# Patient Record
Sex: MALE | Race: OTHER | NOT HISPANIC OR LATINO
[De-identification: names, ages, dates, MRNs, and addresses within clinical notes are randomized per-mention and may not be internally consistent; named-entity substitution may affect disease eponyms.]

---

## 2022-03-16 ENCOUNTER — TRANSCRIPTION ENCOUNTER (OUTPATIENT)
Age: 67
End: 2022-03-16

## 2022-03-18 ENCOUNTER — OUTPATIENT (OUTPATIENT)
Dept: OUTPATIENT SERVICES | Facility: HOSPITAL | Age: 67
LOS: 1 days | End: 2022-03-18

## 2022-03-18 ENCOUNTER — APPOINTMENT (OUTPATIENT)
Dept: DISASTER EMERGENCY | Facility: HOSPITAL | Age: 67
End: 2022-03-18

## 2022-03-18 VITALS
TEMPERATURE: 98 F | RESPIRATION RATE: 16 BRPM | OXYGEN SATURATION: 98 % | SYSTOLIC BLOOD PRESSURE: 119 MMHG | HEART RATE: 63 BPM | DIASTOLIC BLOOD PRESSURE: 79 MMHG

## 2022-03-18 VITALS
OXYGEN SATURATION: 98 % | TEMPERATURE: 98 F | SYSTOLIC BLOOD PRESSURE: 124 MMHG | WEIGHT: 154.1 LBS | DIASTOLIC BLOOD PRESSURE: 74 MMHG | HEART RATE: 61 BPM | HEIGHT: 65 IN | RESPIRATION RATE: 16 BRPM

## 2022-03-18 DIAGNOSIS — U07.1 COVID-19: ICD-10-CM

## 2022-03-18 RX ORDER — SOTROVIMAB 62.5 MG/ML
500 INJECTION, SOLUTION, CONCENTRATE INTRAVENOUS ONCE
Refills: 0 | Status: COMPLETED | OUTPATIENT
Start: 2022-03-18 | End: 2022-03-18

## 2022-03-18 RX ORDER — SODIUM CHLORIDE 9 MG/ML
250 INJECTION INTRAMUSCULAR; INTRAVENOUS; SUBCUTANEOUS
Refills: 0 | Status: DISCONTINUED | OUTPATIENT
Start: 2022-03-18 | End: 2022-04-01

## 2022-03-18 RX ADMIN — SODIUM CHLORIDE 100 MILLILITER(S): 9 INJECTION INTRAMUSCULAR; INTRAVENOUS; SUBCUTANEOUS at 10:27

## 2022-03-18 RX ADMIN — SOTROVIMAB 116 MILLIGRAM(S): 62.5 INJECTION, SOLUTION, CONCENTRATE INTRAVENOUS at 10:20

## 2022-03-18 NOTE — MONOCLONAL ANTIBODY INFUSION - ASSESSMENT AND PLAN
CC: Monoclonal Antibody Infusion/COVID 19 Positive  66y Male with hx of CKD, DVT on Xarelto, and recent dx of COVID 19+ who presents today for elective Sotrovimab infusion. Patient has been screened and was deemed to be a candidate.    Symptoms/ Criteria  Date of Symptom Onset: 3/15/22  Symptoms: cough, congestion, chills, headaches  Date of Positive COVID PCR: 3/16/22  Risk Profile includes: CKD, unvaccinated    PMHx:  Infection due to severe acute respiratory syndrome coronavirus 2 (SARS-CoV-2)    ASSESSMENT:  Pt is COVID positive and symptomatic who was referred to the infusion center for elective Monoclonal antibody infusion (Sotrovimab).    PLAN:  - Infusion procedure explained to patient.  1. FDA has authorized emergency use Sotrovimab, which is not an FDA-approved biological product.  2. The patient or patient's caregiver has the option to accept or refuse administration of Sotrovimab.   3. The significant known and potential risks and benefits of Sotrovimab and the extent to which such risks and benefits are unknown.  4. Information on available alternative treatments and risks and benefits of those alternatives.  5.  Patient verbalized understanding of plan and agrees to treatment.  6.  All questions answered.  - Consent for monoclonal antibody infusion obtained.   - Infuse Sotrovimab 500mg IV over 30 minutes.  - Observe patient for one hour post infusion, and then if stable discharge home with oupt follow up as planned by PMD.    POST INFUSION ASSESSMENT:   Patient completed infusion, and monitored x 1 hour post-infusion with no adverse reactions noted, remained hemodynamically stable.    - Patient tolerated infusion well; denies complaints of chest pain/SOB/dizziness/palpitations.   - VSS for discharge home.  - D/C instructions given/ fact sheet included.  - Patient advised to follow-up with PCP.   - Patient was instructed to self-isolate and use infection control measures (e.g wear mask, isolate, social distance, avoid sharing personal items, clean and disinfect "high touch" surfaces, and frequent handwashing according to the CDC guidelines.   - The patient was informed on what symptoms to be aware of for the next couple of days, and if there are any issues to call the 24/7 clinical call center. Patient was instructed to follow up with primary care provider as needed.    DISCHARGE at 11:30am.    CC: Monoclonal Antibody Infusion/COVID 19 Positive  66y Male with hx of CKD, DVT on Xarelto, and recent dx of COVID 19+ who presents today for elective Sotrovimab infusion. Patient has been screened and was deemed to be a candidate.    Symptoms/ Criteria  Date of Symptom Onset: 3/15/22  Symptoms: cough, congestion, chills, headaches  Date of Positive COVID PCR: 3/16/22  Risk Profile includes: CKD, unvaccinated    PMHx:  Infection due to severe acute respiratory syndrome coronavirus 2 (SARS-CoV-2)    ASSESSMENT:  Pt is COVID positive and symptomatic who was referred to the infusion center for elective Monoclonal antibody infusion (Sotrovimab).    PLAN:  - Infusion procedure explained to patient.  1. FDA has authorized emergency use Sotrovimab, which is not an FDA-approved biological product.  2. The patient or patient's caregiver has the option to accept or refuse administration of Sotrovimab.   3. The significant known and potential risks and benefits of Sotrovimab and the extent to which such risks and benefits are unknown.  4. Information on available alternative treatments and risks and benefits of those alternatives.  5.  Patient verbalized understanding of plan and agrees to treatment.  6.  All questions answered.  - Consent for monoclonal antibody infusion obtained.   - Infuse Sotrovimab 500mg IV over 30 minutes.  - Observe patient for one hour post infusion, and then if stable discharge home with oupt follow up as planned by PMD.    POST INFUSION ASSESSMENT:   Patient completed infusion, and monitored x 1 hour post-infusion with no adverse reactions noted, remained hemodynamically stable.    - Patient tolerated infusion well; denies complaints of chest pain/SOB/dizziness/palpitations.   - VSS for discharge home.  - D/C instructions given/ fact sheet included.  - Patient advised to follow-up with PCP.   - Patient was instructed to self-isolate and use infection control measures (e.g wear mask, isolate, social distance, avoid sharing personal items, clean and disinfect "high touch" surfaces, and frequent handwashing according to the CDC guidelines.   - The patient was informed on what symptoms to be aware of for the next couple of days, and if there are any issues to call the 24/7 clinical call center. Patient was instructed to follow up with primary care provider as needed.    DISCHARGE at 11:50am.

## 2022-03-18 NOTE — MONOCLONAL ANTIBODY INFUSION - EXAM
Exam/findings:  T(C): 36.6 (03-18-22 @ 10:10), Max: 36.6 (03-18-22 @ 10:10)  HR: 60 (03-18-22 @ 10:20) (60 - 61)  BP: 116/76 (03-18-22 @ 10:20) (116/76 - 124/74)  RR: 16 (03-18-22 @ 10:20) (16 - 16)  SpO2: 96% (03-18-22 @ 10:20) (96% - 98%)    PE:   Appearance: NAD	  HEENT:  NC/AT  Cardiovascular:  No edema  Respiratory: no use of accessory muscles  Gastrointestinal:  non-distended   Skin: warm and dry  Neurologic: Non-focal  Extremities: Normal range of motion

## 2022-04-21 PROBLEM — Z00.00 ENCOUNTER FOR PREVENTIVE HEALTH EXAMINATION: Status: ACTIVE | Noted: 2022-04-21

## 2022-05-04 ENCOUNTER — APPOINTMENT (OUTPATIENT)
Dept: PHYSICAL MEDICINE AND REHAB | Facility: CLINIC | Age: 67
End: 2022-05-04
Payer: COMMERCIAL

## 2022-05-04 VITALS — HEIGHT: 65 IN | WEIGHT: 155 LBS | RESPIRATION RATE: 16 BRPM | BODY MASS INDEX: 25.83 KG/M2

## 2022-05-04 DIAGNOSIS — M54.17 RADICULOPATHY, LUMBOSACRAL REGION: ICD-10-CM

## 2022-05-04 DIAGNOSIS — Z87.891 PERSONAL HISTORY OF NICOTINE DEPENDENCE: ICD-10-CM

## 2022-05-04 PROCEDURE — 99204 OFFICE O/P NEW MOD 45 MIN: CPT

## 2022-05-04 NOTE — PHYSICAL EXAM
[FreeTextEntry1] : LUMBAR\par GEN: AAOx3. NAD.\par INSP: Spine alignment midline. No evidence of scoliosis.\par STANCE: Trendelenburg/SLS (-) R (-) L. \par GAIT: (+) Antalgic. Normal reciprocating heel to toe\par SENS: Grossly intact to LT BLE.\par REFLEXES: Symmetric patella, achilles. \par TONE: Normal. No clonus.\par SPECIAL: SLR/Slump (+) R (-) L.\par PALP: Midline/Spinous processes (-).   Paraspinals (-) R  (-) L.   \par            QL (-) R (-) L.      SIJ (-) R (-) L.            GTB (-) R (-) L.\par \par LSpine          ROM    Axial Sx    LE Sx \par Flex               Full       (+)           R (+) L (-).\par Ext                 Full       (-)           R (-) L (-).\par Lat Flex         Full       (-)           R (-) L (-).       \par Rotation         Full       (-)           R (-) L (-). \par Oblique Ext    Full       (-)           R (-) L (-).  \par \par STRENGTH:    RIGHT      LEFT\par Hip Flex            5/5 5/5\par Hip ABd            5/5 5/5\par Knee Ext           5/5 5/5\par Ankle DF           5/5 5/5\par Ankle PF           5/5 5/5\par EHL                   5/5 5/5\par EV                    5/5 5/5

## 2022-05-04 NOTE — ASSESSMENT
[FreeTextEntry1] : Impression:\par 1. RIGHT L5/S1 Radiculopathy\par \par Plan: The history and physical examination were reviewed. The diagnosis was discussed with the patient along with treatment options including physical therapy, oral medication, interventional spine procedures, and surgery; as well as alternative therapeutics such as acupuncture and massage. We also discussed the importance of weight loss, ergonomics, and posture as they pertain to the current condition as well as overall health and well being. The patient has done 3 months of PT without improvement and takes Tylenol with minimal relief. I am recommending that the patient undergo an MRI of the LSpine to further evaluate for disc pathology and nerve root involvement in preparation for possible HERBERTH. We discussed all the risks, benefits, alternative treatments, and prognosis. The patient expressed understanding and would like to move forward. The patient was educated on red flag symptoms and was instructed to call the office should the current condition worsen or new symptoms develop. The patient will follow up for imaging review. The patient expressed verbal understanding and is in agreement with the plan of care. All of the patient's questions and concerns were addressed during today's visit.

## 2022-05-04 NOTE — HISTORY OF PRESENT ILLNESS
[FreeTextEntry1] : Mr. ROSEMARY GALLOWAY is a very pleasant 66 year male who comes in for evaluation of Right Lower Back Pain that has been ongoing for 5 months without any specific injury or inciting event. Patient has done PT for the past 3 months without improvement, he takes Tylenol which provides minimal temporary relief. The pain is located primarily Right Lower Back Pain radiating to the RIGHT Leg intermittent and described as sharp, shooting. The pain is rated as 5/10 and ranges from 3-7/10. The patient's symptoms are aggravated by prolong sitting, bending and alleviated by walking . The patient is retired . The patient feels night pain, numbness/tingling but denies any weakness, or bowel/bladder dysfunction. The patient has no other complaints at this time.\par \par \par \par

## 2022-05-25 ENCOUNTER — APPOINTMENT (OUTPATIENT)
Dept: PHYSICAL MEDICINE AND REHAB | Facility: CLINIC | Age: 67
End: 2022-05-25
Payer: COMMERCIAL

## 2022-05-25 VITALS — WEIGHT: 155 LBS | HEIGHT: 65 IN | RESPIRATION RATE: 16 BRPM | BODY MASS INDEX: 25.83 KG/M2

## 2022-05-25 VITALS — HEIGHT: 65 IN | WEIGHT: 155 LBS | BODY MASS INDEX: 25.83 KG/M2

## 2022-05-25 PROCEDURE — 99214 OFFICE O/P EST MOD 30 MIN: CPT

## 2022-05-25 NOTE — DATA REVIEWED
[MRI] : MRI [FreeTextEntry1] : MRI LUMBAR SPINE 05-: Mild degenerative disc disease. No disc herniation or neurogenic compromise.

## 2022-05-25 NOTE — ASSESSMENT
[FreeTextEntry1] : Impression:\par 1. RIGHT Sciatic Neuropathy\par \par Plan: The history and physical examination were reviewed along with new imaging. There is no evidence of disc herniation/radiculopathy on MRI Lumbar Spine. Symptoms could potentially be due to sciatic nerve entrapment neuropathy. I have provided him with a referral to Neurology for further assessment and treatment, which may include EMG/NCV testing. The patient will follow up here as needed. The patient expressed verbal understanding and is in agreement with the plan of care. All of the patient's questions and concerns were addressed during today's visit.

## 2022-05-25 NOTE — HISTORY OF PRESENT ILLNESS
[FreeTextEntry1] : Mr. ROSEMARY GALLOWAY is a very pleasant 66 year male who comes in for MRI review and follow up of Right Lower Back/Leg Pain. Patient has done PT for the past 3 months without improvement. At present the symptoms are relatively unchanged. The pain is located primarily Right Lower Back Pain radiating to the RIGHT Leg intermittent and described as sharp, shooting. There is numbness/tingling in the Right foot. The pain is rated as 5/10 and ranges from 3-7/10. The patient's symptoms are aggravated by prolong sitting, bending and alleviated by walking. The patient feels night pain, numbness/tingling but denies any weakness, or bowel/bladder dysfunction. The patient has no other complaints at this time.

## 2022-05-25 NOTE — PHYSICAL EXAM
[FreeTextEntry1] : LUMBAR\par GEN: AAOx3. NAD.\par INSP: Spine alignment midline. No evidence of scoliosis.\par STANCE: Trendelenburg/SLS (-) R (-) L. \par GAIT: (+) Antalgic. Normal reciprocating heel to toe\par SENS: Grossly intact to LT BLE.\par REFLEXES: Symmetric patella, achilles. \par TONE: Normal. No clonus.\par SPECIAL: SLR/Slump (+) R (-) L.\par PALP: Midline/Spinous processes (-).   Paraspinals (-) R  (-) L.   \par            QL (-) R (-) L.      SIJ (+) R (-) L.            GTB (-) R (-) L.\par \par LSpine          ROM    Axial Sx    LE Sx \par Flex               Full       (+)           R (+) L (-).\par Ext                 Full       (-)           R (-) L (-).\par Lat Flex         Full       (-)           R (-) L (-).       \par Rotation         Full       (-)           R (-) L (-). \par Oblique Ext    Full       (-)           R (-) L (-).  \par \par STRENGTH:    RIGHT      LEFT\par Hip Flex            5/5 5/5\par Hip ABd            5/5 5/5\par Knee Ext           5/5 5/5\par Ankle DF           5/5 5/5\par Ankle PF           5/5 5/5\par EHL                   5/5 5/5\par EV                    5/5 5/5

## 2022-10-07 ENCOUNTER — APPOINTMENT (OUTPATIENT)
Dept: NEUROSURGERY | Facility: CLINIC | Age: 67
End: 2022-10-07

## 2022-10-07 ENCOUNTER — APPOINTMENT (OUTPATIENT)
Dept: SPINE | Facility: CLINIC | Age: 67
End: 2022-10-07

## 2022-10-07 VITALS
DIASTOLIC BLOOD PRESSURE: 82 MMHG | HEIGHT: 65 IN | RESPIRATION RATE: 18 BRPM | WEIGHT: 155 LBS | BODY MASS INDEX: 25.83 KG/M2 | TEMPERATURE: 97 F | HEART RATE: 59 BPM | SYSTOLIC BLOOD PRESSURE: 121 MMHG | OXYGEN SATURATION: 98 %

## 2022-10-07 DIAGNOSIS — Z87.891 PERSONAL HISTORY OF NICOTINE DEPENDENCE: ICD-10-CM

## 2022-10-07 DIAGNOSIS — G57.00 LESION OF SCIATIC NERVE, UNSPECIFIED LOWER LIMB: ICD-10-CM

## 2022-10-07 PROCEDURE — 99203 OFFICE O/P NEW LOW 30 MIN: CPT

## 2022-10-07 RX ORDER — DICLOFENAC SODIUM 75 MG/1
75 TABLET, DELAYED RELEASE ORAL TWICE DAILY
Qty: 28 | Refills: 1 | Status: DISCONTINUED | COMMUNITY
Start: 2022-05-04 | End: 2022-10-07

## 2022-10-07 NOTE — HISTORY OF PRESENT ILLNESS
[de-identified] : 67 year old man with past medical history of high cholesterol who presents today for neurosurgical evaluation of radiating RIGHT leg pain from buttocks to foot which started in October 2021.\par \par He denies back pain. Reports pain begins in RIGHT buttock and radiates down back of RIGHT Leg to foot with intermittent numbness of heel and 3, 4 and 5th toes in RIGHT foot. He states this is worsened by prolonged sitting and alleviated by walking.\par \par He has done physical therapy in the past with temporary improvement in pain. He saw Dr. Taylor in May 2022 and MRI lumbar spine was done, which demonstrated mild degenerative changes. He has not had worsening symptoms since MRI lumbar spine. He reports his symptoms have been stable. \par \par He denies extremity weakness/numbness/tingling.

## 2022-10-07 NOTE — ASSESSMENT
[FreeTextEntry1] : MRI lumbar spine from 5/2022 demonstrates mild degenerative changes\par Discussed that MRI lumbar spine shows mild degenerative changes without obvious evidence of nerve compression. We discussed that this could potentially be a peripheral neuropathy.\par Recommend EMG of lower extremities for further evaluation of pain and paresthesias - referred to Dr. Kiana Still for further evaluation\par He will notify me once EMG is complete and will schedule follow up appointment in the office for further evaluation\par We discussed RED FLAG symptoms and if present, he was instructed to report to ED for further evaluation\par \par Patient and patient's family verbalize agreement and understanding with plan of care.\par \par

## 2022-10-07 NOTE — PHYSICAL EXAM
[General Appearance - Alert] : alert [General Appearance - In No Acute Distress] : in no acute distress [Oriented To Time, Place, And Person] : oriented to person, place, and time [Motor Tone] : muscle tone was normal in all four extremities [Motor Strength] : muscle strength was normal in all four extremities [No Visual Abnormalities] : no visible abnormailities [Normal] : normal [Able to toe walk] : the patient was able to toe walk [Able to heel walk] : the patient was able to heel walk [Sclera] : the sclera and conjunctiva were normal [Outer Ear] : the ears and nose were normal in appearance [Neck Appearance] : the appearance of the neck was normal [] : no respiratory distress [Abnormal Walk] : normal gait [Skin Color & Pigmentation] : normal skin color and pigmentation [Straight-Leg Raise Test - Left] : straight leg raise of the left leg was negative [Straight-Leg Raise Test - Right] : straight leg raise  of the right leg was negative

## 2022-10-24 ENCOUNTER — APPOINTMENT (OUTPATIENT)
Dept: NEUROSURGERY | Facility: CLINIC | Age: 67
End: 2022-10-24

## 2022-11-18 ENCOUNTER — NON-APPOINTMENT (OUTPATIENT)
Age: 67
End: 2022-11-18

## 2022-11-18 ENCOUNTER — APPOINTMENT (OUTPATIENT)
Dept: NEUROSURGERY | Facility: CLINIC | Age: 67
End: 2022-11-18

## 2022-11-18 VITALS
SYSTOLIC BLOOD PRESSURE: 110 MMHG | DIASTOLIC BLOOD PRESSURE: 75 MMHG | BODY MASS INDEX: 25.83 KG/M2 | RESPIRATION RATE: 18 BRPM | HEIGHT: 65 IN | HEART RATE: 59 BPM | OXYGEN SATURATION: 97 % | WEIGHT: 155 LBS | TEMPERATURE: 98 F

## 2022-11-18 DIAGNOSIS — R20.2 ANESTHESIA OF SKIN: ICD-10-CM

## 2022-11-18 DIAGNOSIS — G62.9 POLYNEUROPATHY, UNSPECIFIED: ICD-10-CM

## 2022-11-18 DIAGNOSIS — R20.0 ANESTHESIA OF SKIN: ICD-10-CM

## 2022-11-18 PROCEDURE — 99215 OFFICE O/P EST HI 40 MIN: CPT

## 2022-11-18 RX ORDER — GABAPENTIN 100 MG/1
100 CAPSULE ORAL
Qty: 30 | Refills: 0 | Status: ACTIVE | COMMUNITY
Start: 2022-11-18 | End: 1900-01-01

## 2022-11-18 NOTE — PHYSICAL EXAM
[General Appearance - Alert] : alert [General Appearance - In No Acute Distress] : in no acute distress [Oriented To Time, Place, And Person] : oriented to person, place, and time [Motor Tone] : muscle tone was normal in all four extremities [Motor Strength] : muscle strength was normal in all four extremities [No Visual Abnormalities] : no visible abnormailities [Normal] : normal [Sclera] : the sclera and conjunctiva were normal [Outer Ear] : the ears and nose were normal in appearance [Neck Appearance] : the appearance of the neck was normal [] : no respiratory distress [Abnormal Walk] : normal gait [Skin Color & Pigmentation] : normal skin color and pigmentation

## 2022-11-20 NOTE — ADDENDUM
[FreeTextEntry1] : Patient previously had DVT with severe right leg swelling and rhabdo with prolonged ICU stay in the past. He has had right leg neuropathic ain since. No evidence for significant nerve compression in lumbar sine. Suspect residual peripheral neuropathy related to his previous leg swelling, possibly from sciatic nerve compression/compartment syndrome while in ICU. No spine intervention warranted at this time. Can try neuropathic pain meds. Followup with neurology.\par \par Arian Palacios M.D.

## 2022-11-20 NOTE — HISTORY OF PRESENT ILLNESS
[de-identified] : 67 year old man with past medical history of high cholesterol who presents today for neurosurgical evaluation of radiating RIGHT leg pain from buttocks to foot which started in October 2021.\par \par He denies back pain. Reports pain begins in RIGHT buttock and radiates down back of RIGHT Leg to foot with intermittent numbness of heel and 3, 4 and 5th toes in RIGHT foot. He states this is worsened by prolonged sitting and alleviated by walking. He also reports significant pain during sleep. \par \par Of note, he states he was hospitalized last year with DVT and rhabdomyolysis and this pain began after his hospitalization.\par \par He has done physical therapy in the past with temporary improvement in pain. He saw Dr. Taylor in May 2022 and MRI lumbar spine was done, which demonstrated mild degenerative changes. He has not had worsening symptoms since MRI lumbar spine. He reports his symptoms have been stable. \par \par He denies extremity weakness/numbness/tingling. \par \par Returns today to review EMG of lower extremities. He does not have report with him but did have EMG with Dr. Still in October.

## 2022-11-20 NOTE — ASSESSMENT
[FreeTextEntry1] : MRI lumbar spine without contrast done on 10/7/22 reviewed by Dr. Palacios with patient demonstrates no spinal cord compression and no nerve compression.\par Description and history of pain characteristic of peripheral neuropathy\par Recommend gabapentin 100 mg at bedtime.\par If no improvement, recommend follow up with Dr. Kiana Still for discussion of further treatments.\par Will obtain EMG from Dr. Still and call patient with results.\par \par Patient verbalizes agreement and understanding with plan of care.\par \par I, Dr. Palacios, personally performed the evaluation and management (E/M) services for this new patient.  That E/M includes conducting the initial examination, assessing all conditions, and establishing the plan of care.  Today, my ACP, Oralia Chakraborty, was here to observe my evaluation and management services for this patient to be followed going forward.\par \par \par \par